# Patient Record
Sex: FEMALE | Race: BLACK OR AFRICAN AMERICAN | Employment: FULL TIME | URBAN - METROPOLITAN AREA
[De-identification: names, ages, dates, MRNs, and addresses within clinical notes are randomized per-mention and may not be internally consistent; named-entity substitution may affect disease eponyms.]

---

## 2018-08-27 ENCOUNTER — HOSPITAL ENCOUNTER (EMERGENCY)
Age: 33
Discharge: HOME | End: 2018-08-27
Attending: EMERGENCY MEDICINE
Payer: COMMERCIAL

## 2018-08-27 ENCOUNTER — APPOINTMENT (OUTPATIENT)
Dept: ULTRASOUND IMAGING | Age: 33
End: 2018-08-27
Payer: COMMERCIAL

## 2018-08-27 VITALS
OXYGEN SATURATION: 100 % | SYSTOLIC BLOOD PRESSURE: 124 MMHG | WEIGHT: 183.2 LBS | DIASTOLIC BLOOD PRESSURE: 74 MMHG | TEMPERATURE: 98.6 F | HEIGHT: 63 IN | HEART RATE: 70 BPM | BODY MASS INDEX: 32.46 KG/M2 | RESPIRATION RATE: 16 BRPM

## 2018-08-27 DIAGNOSIS — O46.90 VAGINAL BLEEDING IN PREGNANCY: Primary | ICD-10-CM

## 2018-08-27 LAB
BACTERIA: ABNORMAL /HPF
BILIRUBIN URINE: NEGATIVE
BLOOD, URINE: ABNORMAL
CLARITY: CLEAR
COLOR: YELLOW
EPITHELIAL CELLS, UA: ABNORMAL /HPF
GLUCOSE URINE: NEGATIVE MG/DL
GONADOTROPIN, CHORIONIC (HCG) QUANT: NORMAL MIU/ML
HCT VFR BLD CALC: 37 % (ref 36–48)
HEMOGLOBIN: 12.5 G/DL (ref 12–16)
KETONES, URINE: NEGATIVE MG/DL
LEUKOCYTE ESTERASE, URINE: ABNORMAL
MCH RBC QN AUTO: 32.4 PG (ref 26–34)
MCHC RBC AUTO-ENTMCNC: 33.7 G/DL (ref 31–36)
MCV RBC AUTO: 96.1 FL (ref 80–100)
MICROSCOPIC EXAMINATION: YES
NITRITE, URINE: NEGATIVE
PDW BLD-RTO: 13.1 % (ref 12.4–15.4)
PH UA: 5.5
PLATELET # BLD: 274 K/UL (ref 135–450)
PMV BLD AUTO: 8.1 FL (ref 5–10.5)
PROTEIN UA: NEGATIVE MG/DL
RBC # BLD: 3.85 M/UL (ref 4–5.2)
RBC UA: ABNORMAL /HPF (ref 0–2)
SPECIFIC GRAVITY UA: 1.01
URINE TYPE: ABNORMAL
UROBILINOGEN, URINE: 0.2 E.U./DL
WBC # BLD: 11.3 K/UL (ref 4–11)
WBC UA: ABNORMAL /HPF (ref 0–5)

## 2018-08-27 PROCEDURE — 36415 COLL VENOUS BLD VENIPUNCTURE: CPT

## 2018-08-27 PROCEDURE — 81001 URINALYSIS AUTO W/SCOPE: CPT

## 2018-08-27 PROCEDURE — 99284 EMERGENCY DEPT VISIT MOD MDM: CPT

## 2018-08-27 PROCEDURE — 84702 CHORIONIC GONADOTROPIN TEST: CPT

## 2018-08-27 PROCEDURE — 85027 COMPLETE CBC AUTOMATED: CPT

## 2018-08-27 PROCEDURE — 76801 OB US < 14 WKS SINGLE FETUS: CPT

## 2018-08-27 ASSESSMENT — ENCOUNTER SYMPTOMS
NAUSEA: 0
VOMITING: 0
DIARRHEA: 0

## 2018-08-27 NOTE — ED PROVIDER NOTES
CHIEF COMPLAINT  Vaginal Bleeding      HISTORY OF PRESENT ILLNESS  Gerhard Antunez is a 26 y.o. female, who presents to the ED with vaginal bleeding in pregnancy. Patient's last menstrual period was  she went to her primary physician recently who noted a positive pregnancy test.  Yesterday after having sexual intercourse she noted 20 minutes of moderate pelvic pain without bleeding today also after intercourse she noted a small amount of dark red blood without pain. No dizziness or lightheadedness, no fever, no urinary symptoms. Patient is  remote history of chlamydia no history of ectopic pregnancy. ROS    I have reviewed the following from the nursing documentation. Past Medical History:   Diagnosis Date    Hypertension      Past Surgical History:   Procedure Laterality Date    ABDOMEN SURGERY      KIDNEY SURGERY  2010    renal stent     History reviewed. No pertinent family history. Social History     Social History    Marital status: Single     Spouse name: N/A    Number of children: N/A    Years of education: N/A     Occupational History    Not on file. Social History Main Topics    Smoking status: Never Smoker    Smokeless tobacco: Never Used    Alcohol use No    Drug use: Yes     Types: Marijuana    Sexual activity: Not on file     Other Topics Concern    Not on file     Social History Narrative    No narrative on file     No current facility-administered medications for this encounter.       Current Outpatient Prescriptions   Medication Sig Dispense Refill    AMLODIPINE BESYLATE PO Take by mouth      Multiple Vitamins-Minerals (THERAPEUTIC MULTIVITAMIN-MINERALS) tablet Take 1 tablet by mouth daily      hydrochlorothiazide (HYDRODIURIL) 25 MG tablet Take 25 mg by mouth daily      cyclobenzaprine (FLEXERIL) 10 MG tablet Take 1 tablet by mouth 3 times daily as needed for Muscle spasms 21 tablet 0    diclofenac (VOLTAREN) 75 MG EC tablet Take 1 tablet by mouth 2 times

## 2018-08-27 NOTE — ED TRIAGE NOTES
Pt states she is 6 weeks pregnant and had sex yesterday and started having abd cramping and now today having some vaginal bleeding

## 2018-08-27 NOTE — ED PROVIDER NOTES
[address] [place]          ATTENDING PHYSICIAN NOTE       Date of evaluation: 8/27/2018    Chief Complaint     Vaginal Bleeding      History of Present Illness     Maura Flanagan is a 26 y.o. female who presents From the [facility] emergency department with vaginal bleeding and pain after intercourse last night in the setting of early pregnancy. The patient was evaluated there and was concerned about the pain and bleeding and need to rule out ectopic pregnancy and she was sent here. The patient actually had no pain today just bleeding. Yesterday she had the pain followed by the bleeding. Her symptoms have nearly abated and she has only minimal bleeding at this time. Review of Systems     Review of Systems   Constitutional: Negative for chills and fever. Gastrointestinal: Negative for diarrhea, nausea and vomiting. Genitourinary: Positive for pelvic pain and vaginal bleeding. Negative for vaginal discharge. All other systems reviewed and are negative. Past Medical, Surgical, Family, and Social History     She has a past medical history of Hypertension. She has a past surgical history that includes Abdomen surgery and Kidney surgery (2010). Her family history is not on file. She reports that she has never smoked. She has never used smokeless tobacco. She reports that she uses drugs, including Marijuana. She reports that she does not drink alcohol. Medications     Discharge Medication List as of 8/27/2018  5:52 PM      CONTINUE these medications which have NOT CHANGED    Details   AMLODIPINE BESYLATE PO Take by mouth      Multiple Vitamins-Minerals (THERAPEUTIC MULTIVITAMIN-MINERALS) tablet Take 1 tablet by mouth daily      hydrochlorothiazide (HYDRODIURIL) 25 MG tablet Take 25 mg by mouth daily      VITAMIN D, ERGOCALCIFEROL, PO Take by mouth             Allergies     She has No Known Allergies.     Physical Exam     INITIAL VITALS: BP: 135/84, Temp: 98.6 °F (37 Bacteria, UA 1+ (A) /HPF       RECENT VITALS:  BP: 124/74, Temp: 98.6 °F (37 °C), Pulse: 70, Resp: 16, SpO2: 100 %       ED Course     Nursing Notes, Past Medical Hx, Past Surgical Hx, Social Hx, Allergies, and Family Hx were reviewed. The patient was given the following medications:  No orders of the defined types were placed in this encounter. CONSULTS:  None    MEDICAL DECISION MAKING / ASSESSMENT / PLAN     Vinicio Mccray is a 26 y.o. female presenting with lower abdominal pain followed by vaginal bleeding which is improving at this point. She was sent from work would for evaluation of possible ectopic pregnancy. Ultrasound revealed no evidence of ectopic pregnancy but a an IUP with gestational age of 7 weeks and 4 days with visible cardiac activity. The patient will be advised to follow-up with her ObGyn in 48 hours to make sure that this is continuing on as expected. Clinical Impression     1.  Vaginal bleeding in pregnancy        Disposition     PATIENT REFERRED TO:  Catalina Ding MD  [facility] [address]. [id number] [phone]    In 2 days        DISCHARGE MEDICATIONS:  Discharge Medication List as of 8/27/2018  5:52 PM          DISPOSITION Decision To Discharge 08/27/2018 05:25:17 PM       Sarah Clarke MD  08/27/18 0079

## 2019-12-23 ENCOUNTER — HOSPITAL ENCOUNTER (OUTPATIENT)
Dept: GENERAL RADIOLOGY | Age: 34
Discharge: HOME | End: 2019-12-23
Payer: COMMERCIAL

## 2019-12-23 ENCOUNTER — HOSPITAL ENCOUNTER (OUTPATIENT)
Age: 34
Discharge: HOME | End: 2019-12-23
Payer: COMMERCIAL

## 2019-12-23 DIAGNOSIS — M54.2 NECK PAIN: ICD-10-CM

## 2019-12-23 PROCEDURE — 72050 X-RAY EXAM NECK SPINE 4/5VWS: CPT

## 2019-12-27 ENCOUNTER — HOSPITAL ENCOUNTER (OUTPATIENT)
Dept: OCCUPATIONAL THERAPY | Age: 34
Setting detail: THERAPIES SERIES
Discharge: HOME | End: 2019-12-27
Payer: COMMERCIAL

## 2019-12-27 PROCEDURE — 97165 OT EVAL LOW COMPLEX 30 MIN: CPT | Performed by: OCCUPATIONAL THERAPIST

## 2019-12-27 PROCEDURE — 97110 THERAPEUTIC EXERCISES: CPT | Performed by: OCCUPATIONAL THERAPIST

## 2019-12-27 PROCEDURE — 97140 MANUAL THERAPY 1/> REGIONS: CPT | Performed by: OCCUPATIONAL THERAPIST

## 2020-01-06 ENCOUNTER — HOSPITAL ENCOUNTER (OUTPATIENT)
Dept: OCCUPATIONAL THERAPY | Age: 35
Setting detail: THERAPIES SERIES
Discharge: HOME | End: 2020-01-06
Payer: COMMERCIAL

## 2020-01-13 ENCOUNTER — HOSPITAL ENCOUNTER (OUTPATIENT)
Dept: OCCUPATIONAL THERAPY | Age: 35
Setting detail: THERAPIES SERIES
Discharge: HOME | End: 2020-01-13
Payer: COMMERCIAL

## 2020-01-13 NOTE — FLOWSHEET NOTE
[address] [address] [address], [address], [address]  Phone: [phone] Fax: [phone]       Occupational Therapy  Cancellation/No-show Note  Patient Name:  Tram Whyte   :  1985   Date:  2020  Cancelled visits to date: 0  No-shows to date: 2    For today's appointment patient:  []    Cancelled  []    Rescheduled appointment  [x]    No-show     Reason given by patient:  []    Patient ill  []    Conflicting appointment  []    No transportation    []    Conflict with work  []    No reason given  [x]    Other:  Called and left message inquiring to status and informing her we are cancelling her next two appointments due to these two no shows. Instructed her to please call dept if she wants to reschedule.    Comments:      Electronically signed by:  RA Liriano/GIOVANNY, [address]

## 2020-06-23 ENCOUNTER — OFFICE VISIT (OUTPATIENT)
Dept: PRIMARY CARE CLINIC | Age: 35
End: 2020-06-23
Payer: COMMERCIAL

## 2020-06-23 PROCEDURE — G8428 CUR MEDS NOT DOCUMENT: HCPCS | Performed by: INTERNAL MEDICINE

## 2020-06-23 PROCEDURE — 99211 OFF/OP EST MAY X REQ PHY/QHP: CPT | Performed by: INTERNAL MEDICINE

## 2020-06-23 PROCEDURE — G8421 BMI NOT CALCULATED: HCPCS | Performed by: INTERNAL MEDICINE

## 2020-06-23 PROCEDURE — 1036F TOBACCO NON-USER: CPT | Performed by: INTERNAL MEDICINE

## 2020-06-23 NOTE — PROGRESS NOTES
Possible COVID-19 with isolation instructions and management of symptoms  [] Follow-up with primary care physician or emergency department if worsens  [] Referred to emergency department for evaluation    [] Evaluation per physician/nurse practitioner in clinic  [] Prescription sent in  [] No Prescription sent in    An  electronic signature was used to authenticate this note.      --Nkechi Sainz MA on 6/23/2020 at 11:01 AM   Written by YAHAIRA De acting as scribe for Lilly Be MD on 6/23/2020 at 11:01 AM

## 2020-06-25 LAB
SARS-COV-2: NOT DETECTED
SOURCE: NORMAL